# Patient Record
Sex: MALE | Race: BLACK OR AFRICAN AMERICAN | URBAN - METROPOLITAN AREA
[De-identification: names, ages, dates, MRNs, and addresses within clinical notes are randomized per-mention and may not be internally consistent; named-entity substitution may affect disease eponyms.]

---

## 2021-01-14 ENCOUNTER — HOSPITAL ENCOUNTER (EMERGENCY)
Facility: CLINIC | Age: 21
Discharge: HOME | End: 2021-01-14
Attending: EMERGENCY MEDICINE | Admitting: EMERGENCY MEDICINE
Payer: COMMERCIAL

## 2021-01-14 ENCOUNTER — APPOINTMENT (OUTPATIENT)
Dept: GENERAL RADIOLOGY | Facility: CLINIC | Age: 21
End: 2021-01-14
Attending: EMERGENCY MEDICINE
Payer: COMMERCIAL

## 2021-01-14 VITALS
HEART RATE: 90 BPM | HEIGHT: 71 IN | BODY MASS INDEX: 23.1 KG/M2 | WEIGHT: 165 LBS | TEMPERATURE: 98.4 F | DIASTOLIC BLOOD PRESSURE: 69 MMHG | OXYGEN SATURATION: 99 % | RESPIRATION RATE: 18 BRPM | SYSTOLIC BLOOD PRESSURE: 148 MMHG

## 2021-01-14 DIAGNOSIS — S63.501A WRIST SPRAIN, RIGHT, INITIAL ENCOUNTER: ICD-10-CM

## 2021-01-14 DIAGNOSIS — S40.811A ABRASION OF RIGHT UPPER EXTREMITY, INITIAL ENCOUNTER: ICD-10-CM

## 2021-01-14 DIAGNOSIS — S40.021A CONTUSION OF RIGHT UPPER EXTREMITY, INITIAL ENCOUNTER: ICD-10-CM

## 2021-01-14 PROCEDURE — 99284 EMERGENCY DEPT VISIT MOD MDM: CPT | Mod: 25

## 2021-01-14 PROCEDURE — 29125 APPL SHORT ARM SPLINT STATIC: CPT | Mod: RT

## 2021-01-14 PROCEDURE — 73130 X-RAY EXAM OF HAND: CPT | Mod: RT

## 2021-01-14 PROCEDURE — 73060 X-RAY EXAM OF HUMERUS: CPT | Mod: RT

## 2021-01-14 PROCEDURE — 73090 X-RAY EXAM OF FOREARM: CPT | Mod: RT

## 2021-01-14 RX ORDER — LIDOCAINE HYDROCHLORIDE 20 MG/ML
10 SOLUTION OROPHARYNGEAL ONCE
Status: DISCONTINUED | OUTPATIENT
Start: 2021-01-14 | End: 2021-01-14

## 2021-01-14 RX ORDER — LIDOCAINE HYDROCHLORIDE 20 MG/ML
10 JELLY TOPICAL ONCE
Status: DISCONTINUED | OUTPATIENT
Start: 2021-01-14 | End: 2021-01-14 | Stop reason: HOSPADM

## 2021-01-14 SDOH — HEALTH STABILITY: MENTAL HEALTH: HOW OFTEN DO YOU HAVE A DRINK CONTAINING ALCOHOL?: NEVER

## 2021-01-14 ASSESSMENT — MIFFLIN-ST. JEOR
SCORE: 1785.57
SCORE: 1785.57

## 2021-01-14 ASSESSMENT — ENCOUNTER SYMPTOMS: WOUND: 1
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
## 2021-01-14 NOTE — ED AVS SNAPSHOT
[facility]  [address] [address]  Phone: [phone]  Fax: [phone]                                    Naomy Merida   MRN: [id number]    Department: [facility]   Date of Visit: 1/14/2021           After Visit Summary Signature Page    I have received my discharge instructions, and my questions have been answered. I have discussed any challenges I see with this plan with the nurse or doctor.    ..........................................................................................................................................  Patient/Patient Representative Signature      ..........................................................................................................................................  Patient Representative Print Name and Relationship to Patient    ..................................................               ................................................  Date                                   Time    ..........................................................................................................................................  Reviewed by Signature/Title    ...................................................              ..............................................  Date                                               Time          22EPIC Rev 08/18
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
